# Patient Record
Sex: FEMALE
[De-identification: names, ages, dates, MRNs, and addresses within clinical notes are randomized per-mention and may not be internally consistent; named-entity substitution may affect disease eponyms.]

---

## 2019-02-14 ENCOUNTER — RESULT REVIEW (OUTPATIENT)
Age: 29
End: 2019-02-14

## 2021-04-05 ENCOUNTER — APPOINTMENT (OUTPATIENT)
Dept: DISASTER EMERGENCY | Facility: OTHER | Age: 31
End: 2021-04-05
Payer: COMMERCIAL

## 2021-04-05 PROCEDURE — 0001A: CPT

## 2021-04-26 ENCOUNTER — APPOINTMENT (OUTPATIENT)
Dept: DISASTER EMERGENCY | Facility: OTHER | Age: 31
End: 2021-04-26
Payer: COMMERCIAL

## 2021-04-26 PROCEDURE — 0002A: CPT

## 2024-03-15 ENCOUNTER — EMERGENCY (EMERGENCY)
Facility: HOSPITAL | Age: 34
LOS: 1 days | Discharge: HOME | End: 2024-03-15
Admitting: EMERGENCY MEDICINE
Payer: COMMERCIAL

## 2024-03-15 VITALS
TEMPERATURE: 97 F | DIASTOLIC BLOOD PRESSURE: 97 MMHG | OXYGEN SATURATION: 93 % | RESPIRATION RATE: 22 BRPM | HEART RATE: 130 BPM | SYSTOLIC BLOOD PRESSURE: 142 MMHG

## 2024-03-15 VITALS
TEMPERATURE: 98 F | DIASTOLIC BLOOD PRESSURE: 71 MMHG | SYSTOLIC BLOOD PRESSURE: 124 MMHG | RESPIRATION RATE: 18 BRPM | OXYGEN SATURATION: 95 % | HEART RATE: 102 BPM

## 2024-03-15 LAB
ALBUMIN SERPL ELPH-MCNC: 3.7 G/DL — SIGNIFICANT CHANGE UP (ref 3.3–5)
ALP SERPL-CCNC: 99 U/L — SIGNIFICANT CHANGE UP (ref 40–120)
ALT FLD-CCNC: 43 U/L — HIGH (ref 4–33)
ANION GAP SERPL CALC-SCNC: 15 MMOL/L — HIGH (ref 7–14)
APAP SERPL-MCNC: <10 UG/ML — LOW (ref 15–25)
AST SERPL-CCNC: 126 U/L — HIGH (ref 4–32)
BASOPHILS # BLD AUTO: 0.03 K/UL — SIGNIFICANT CHANGE UP (ref 0–0.2)
BASOPHILS NFR BLD AUTO: 0.6 % — SIGNIFICANT CHANGE UP (ref 0–2)
BILIRUB SERPL-MCNC: 0.5 MG/DL — SIGNIFICANT CHANGE UP (ref 0.2–1.2)
BUN SERPL-MCNC: 4 MG/DL — LOW (ref 7–23)
CALCIUM SERPL-MCNC: 8.7 MG/DL — SIGNIFICANT CHANGE UP (ref 8.4–10.5)
CHLORIDE SERPL-SCNC: 103 MMOL/L — SIGNIFICANT CHANGE UP (ref 98–107)
CO2 SERPL-SCNC: 26 MMOL/L — SIGNIFICANT CHANGE UP (ref 22–31)
CREAT SERPL-MCNC: 0.68 MG/DL — SIGNIFICANT CHANGE UP (ref 0.5–1.3)
EGFR: 118 ML/MIN/1.73M2 — SIGNIFICANT CHANGE UP
EOSINOPHIL # BLD AUTO: 0.01 K/UL — SIGNIFICANT CHANGE UP (ref 0–0.5)
EOSINOPHIL NFR BLD AUTO: 0.2 % — SIGNIFICANT CHANGE UP (ref 0–6)
ETHANOL SERPL-MCNC: 322 MG/DL — HIGH
GLUCOSE SERPL-MCNC: 86 MG/DL — SIGNIFICANT CHANGE UP (ref 70–99)
HCG SERPL-ACNC: <1 MIU/ML — SIGNIFICANT CHANGE UP
HCT VFR BLD CALC: 37.1 % — SIGNIFICANT CHANGE UP (ref 34.5–45)
HGB BLD-MCNC: 13.3 G/DL — SIGNIFICANT CHANGE UP (ref 11.5–15.5)
IANC: 2.43 K/UL — SIGNIFICANT CHANGE UP (ref 1.8–7.4)
IMM GRANULOCYTES NFR BLD AUTO: 0.2 % — SIGNIFICANT CHANGE UP (ref 0–0.9)
LYMPHOCYTES # BLD AUTO: 2.41 K/UL — SIGNIFICANT CHANGE UP (ref 1–3.3)
LYMPHOCYTES # BLD AUTO: 46.1 % — HIGH (ref 13–44)
MCHC RBC-ENTMCNC: 35.8 GM/DL — SIGNIFICANT CHANGE UP (ref 32–36)
MCHC RBC-ENTMCNC: 36.1 PG — HIGH (ref 27–34)
MCV RBC AUTO: 100.8 FL — HIGH (ref 80–100)
MONOCYTES # BLD AUTO: 0.34 K/UL — SIGNIFICANT CHANGE UP (ref 0–0.9)
MONOCYTES NFR BLD AUTO: 6.5 % — SIGNIFICANT CHANGE UP (ref 2–14)
NEUTROPHILS # BLD AUTO: 2.43 K/UL — SIGNIFICANT CHANGE UP (ref 1.8–7.4)
NEUTROPHILS NFR BLD AUTO: 46.4 % — SIGNIFICANT CHANGE UP (ref 43–77)
NRBC # BLD: 0 /100 WBCS — SIGNIFICANT CHANGE UP (ref 0–0)
NRBC # FLD: 0.02 K/UL — HIGH (ref 0–0)
PLATELET # BLD AUTO: 391 K/UL — SIGNIFICANT CHANGE UP (ref 150–400)
POTASSIUM SERPL-MCNC: 3.4 MMOL/L — LOW (ref 3.5–5.3)
POTASSIUM SERPL-SCNC: 3.4 MMOL/L — LOW (ref 3.5–5.3)
PROT SERPL-MCNC: 7.2 G/DL — SIGNIFICANT CHANGE UP (ref 6–8.3)
RBC # BLD: 3.68 M/UL — LOW (ref 3.8–5.2)
RBC # FLD: 12.2 % — SIGNIFICANT CHANGE UP (ref 10.3–14.5)
SALICYLATES SERPL-MCNC: <0.3 MG/DL — LOW (ref 15–30)
SARS-COV-2 RNA SPEC QL NAA+PROBE: SIGNIFICANT CHANGE UP
SODIUM SERPL-SCNC: 144 MMOL/L — SIGNIFICANT CHANGE UP (ref 135–145)
TOXICOLOGY SCREEN, DRUGS OF ABUSE, SERUM RESULT: SIGNIFICANT CHANGE UP
TSH SERPL-MCNC: 1.19 UIU/ML — SIGNIFICANT CHANGE UP (ref 0.27–4.2)
WBC # BLD: 5.23 K/UL — SIGNIFICANT CHANGE UP (ref 3.8–10.5)
WBC # FLD AUTO: 5.23 K/UL — SIGNIFICANT CHANGE UP (ref 3.8–10.5)

## 2024-03-15 PROCEDURE — 93010 ELECTROCARDIOGRAM REPORT: CPT

## 2024-03-15 PROCEDURE — 99285 EMERGENCY DEPT VISIT HI MDM: CPT

## 2024-03-15 RX ADMIN — Medication 2 MILLIGRAM(S): at 17:48

## 2024-03-15 NOTE — ED ADULT NURSE NOTE - NSFALLUNIVINTERV_ED_ALL_ED
Bed/Stretcher in lowest position, wheels locked, appropriate side rails in place/Call bell, personal items and telephone in reach/Instruct patient to call for assistance before getting out of bed/chair/stretcher/Non-slip footwear applied when patient is off stretcher/Steen to call system/Physically safe environment - no spills, clutter or unnecessary equipment/Purposeful proactive rounding/Room/bathroom lighting operational, light cord in reach Bed/Stretcher in lowest position, wheels locked, appropriate side rails in place/Instruct patient to call for assistance before getting out of bed/chair/stretcher/Non-slip footwear applied when patient is off stretcher/Physically safe environment - no spills, clutter or unnecessary equipment/Purposeful proactive rounding

## 2024-03-15 NOTE — ED ADULT TRIAGE NOTE - CHIEF COMPLAINT QUOTE
Pt Rapid Responds found out front of hospital, rolling around on grass, st" I am having a panic attack" Pt refusing to cooperate with triage questions, refusing to give name. Pt crying . Pt st" I have anxiety I am having a panic attack....I am not crazy.... I take prozac and took it yesterday...today I am having bad day...just quit my job...my friend is sick up stairs . I am under a lot of stress and I drank a lot today. I don't want to hurt self or anyone else. Im just stressed. I want to leave I don't want to stay here." Pt elopement risk. Pt to BH. NP Savita to triage.

## 2024-03-15 NOTE — ED PROVIDER NOTE - NSFOLLOWUPINSTRUCTIONS_ED_ALL_ED_FT
Follow up with your PMD within 48-72 hrs. Show copies of your reports given to you.   Worsening, continued or new concerning symptoms return to the emergency department.    You have been given information necessary to follow up with the  [facility] ([facility]) Crisis center & other outpatient  psychiatric clinics within your community    • [facility] walk in Crisis centre  [address] [place] [address]  [phone] [email or web address]  Hours of operation:  Day	                                        Hours  Sunday                                  Closed  Monday                                9am - 3pm  Tuesday                                9am - 3pm  Wednesday                          9am - 3pm  Thursday                               9am - 3pm  Friday                                    9am - 3pm  Saturday                                Closed

## 2024-03-15 NOTE — ED ADULT TRIAGE NOTE - NSTRIAGECARE_GEN_A_ER
_     Chief Complaint   Patient presents with    Follow-up    Shortness of Breath     at rest and with activity     Cough     not resolved since PE    Bleeding/Bruising     bruises easily      DIAGNOSIS:        Severe unintentional weight loss  Un triggered pulmonary embolism  Abdominal pain  Recent GI bleeding  Gastritis  Persistent diarrhea  Elevated tumor markers. CURRENT THERAPY:         Xarelto for PE  GI work-up    BRIEF CASE HISTORY:      Ms. Noé Clark is a very pleasant 48 y.o. female with history of multiple co morbidities as listed. Patient is referred for evaluation of weight loss and possible underlying malignancy. The patient was recently hospitalized because of increasing shortness of breath and respiratory failure. She was found to have pulmonary embolism. She was treated with anticoagulation with Xarelto. Shortly before discharge she had upper GI bleeding. She had endoscopies and she was found to have severe gastritis. After stabilization patient was back on anticoagulation. She has been stable since then. Patient had problems with weight loss. She lost about 100 pounds over the last year. She has generalized weakness and fatigue. She has decreased appetite. She had chronic nausea. Early satiety. She denies any fever or night sweats. She had problem with night sweats in the past.  No enlarged lymph nodes. No unusual headaches or dizziness. No other complaints. Patient quit smoking at the time of hospitalization. Used to smoke 1 pack/day since teenage. Social alcohol. .     INTERIM HISTORY:    seen for follow up after ct scans and labs. She had GI work-up as well. She had endoscopies. Continues to have weight loss and weakness. She has persistent diarrhea. No GI bleeding. She is on Xarelto for pulmonary embolism tolerated well. No chest pain or hemoptysis.   She has symptoms related to COPD. PAST MEDICAL HISTORY: has a past medical history of Anxiety, CAD (coronary artery disease), Fatty liver, GERD (gastroesophageal reflux disease), Headache, History of bronchitis, Hyperlipidemia, Hypothyroidism, Kidney stone, LFT elevation, MVP (mitral valve prolapse), Obesity, Osteoarthritis of cervical spine, Pulmonary emboli (HCC), Type 2 diabetes mellitus without complication ([facility] [address] and Umbilical hernia. PAST SURGICAL HISTORY: has a past surgical history that includes Upper gastrointestinal endoscopy (2010); hernia repair; Cholecystectomy; Appendectomy;  section; Colonoscopy (); Colonoscopy (14); Colonoscopy (2014); pr exploratory of abdomen (10/21/2014); Colonoscopy (N/A, 2018); Hysterectomy, total abdominal; Upper gastrointestinal endoscopy (N/A, 3/10/2021); and Colonoscopy (N/A, 2021). CURRENT MEDICATIONS:  has a current medication list which includes the following prescription(s): tizanidine, oxycodone-acetaminophen, ropinirole, ondansetron, esomeprazole, sertraline, levothyroxine, trazodone, albuterol sulfate hfa, rivaroxaban, clonazepam, topiramate, metoprolol tartrate, and albuterol sulfate hfa. ALLERGIES:  is allergic to compazine [prochlorperazine], sulfa antibiotics, and adhesive tape. FAMILY HISTORY: Grandmother had breast cancer. Great-grandmother had breast cancer. I believe one of her parents had cancer as well. Possibly pancreatic. Otherwise negative for any hematological or oncological conditions. SOCIAL HISTORY:  reports that she quit smoking about 13 months ago. Her smoking use included cigarettes. She has a 8.25 pack-year smoking history. She has never used smokeless tobacco. She reports that she does not drink alcohol and does not use drugs. REVIEW OF SYSTEMS:     · General: Positive for weakness and fatigue. Positive for weight loss and decreased appetite. No fever or chills.    · Eyes: No blurred vision, eye pain or double vision. · Ears: No hearing problems or drainage. No tinnitus. · Throat: No sore throat, problems with swallowing or dysphagia. · Respiratory: No cough, sputum or hemoptysis. Positive for exertional shortness of breath. No pleuritic chest pain. · Cardiovascular: No chest pain, orthopnea or PND. No lower extremity edema. No palpitation. · Gastrointestinal: No problems with swallowing. No abdominal pain or bloating. No nausea or vomiting. No diarrhea or constipation. No GI bleeding. · Genitourinary: No dysuria, hematuria, frequency or urgency. · Musculoskeletal: No muscle aches or pains. No limitation of movement. No back pain. No gait disturbance, No joint complaints. · Dermatologic: No skin rashes or pruritus. No skin lesions or discolorations. · Psychiatric: No depression, anxiety, or stress or signs of schizophrenia. No change in mood or affect. · Hematologic: No history of bleeding tendency. No bruises or ecchymosis. No history of clotting problems. · Infectious disease: No fever, chills or frequent infections. · Endocrine: No polydipsia or polyuria. No temperature intolerance. · Neurologic: No headaches or dizziness. No weakness or numbness of the extremities. No changes in balance, coordination,  memory, mentation, behavior. · Allergic/Immunologic: No nasal congestion or hives. No repeated infections. PHYSICAL EXAM:  The patient is not in acute distress. Vital signs: Blood pressure 113/79, pulse 73, temperature 98.3 °F (36.8 °C), temperature source Oral, resp. rate 16, weight 186 lb 4.8 oz (84.5 kg), last menstrual period 11/01/2010, not currently breastfeeding.      General appearance - well appearing, not in pain or distress  Mental status - good mood, alert and oriented  Eyes - pupils equal and reactive, extraocular eye movements intact  Ears - bilateral TM's and external ear canals normal  Nose - normal and patent, no erythema, discharge or polyps  Mouth - mucous membranes moist, pharynx normal without lesions  Neck - supple, no significant adenopathy  Lymphatics - no palpable lymphadenopathy, no hepatosplenomegaly  Chest - clear to auscultation, no wheezes, rales or rhonchi, symmetric air entry  Heart - normal rate, regular rhythm, normal S1, S2, no murmurs, rubs, clicks or gallops  Abdomen - soft, generalized abdominal tenderness, nondistended, no masses or organomegaly  Neurological - alert, oriented, normal speech, no focal findings or movement disorder noted  Musculoskeletal - no joint tenderness, deformity or swelling  Extremities - peripheral pulses normal, no pedal edema, no clubbing or cyanosis  Skin - normal coloration and turgor, no rashes, no suspicious skin lesions noted     Review of Diagnostic data:   Lab Results   Component Value Date    WBC 5.3 02/01/2022    HGB 14.0 02/01/2022    HCT 40.8 02/01/2022    MCV 92.5 02/01/2022     02/01/2022       Chemistry        Component Value Date/Time     02/01/2022 0955    K 4.0 02/01/2022 0955     02/01/2022 0955    CO2 21 02/01/2022 0955    BUN 21 (H) 02/01/2022 0955    CREATININE 0.94 (H) 02/01/2022 0955        Component Value Date/Time    CALCIUM 9.4 02/01/2022 0955    ALKPHOS 114 (H) 02/01/2022 0955    AST 13 02/01/2022 0955    ALT 8 02/01/2022 0955    BILITOT 0.17 (L) 02/01/2022 0955            IMPRESSION:   Severe unintentional weight loss  Un triggered pulmonary embolism  Abdominal pain  Recent GI bleeding  Gastritis  Persistent diarrhea  Elevated tumor markers. COPD      PLAN:I again explained to the patient the nature of these problems with unintentional weight loss and unexplained pulmonary embolism. There were no major risk factors for pulmonary embolism. This is concerning for underlying cause. Obviously differential diagnosis for unprovoked pulmonary embolism will include malignancy. That becomes more concerning with her unintentional weight loss.     Work up for EKG hypercoagulability is negative. Further workup for occult malignancy is negative except for elevated CEA and . She had previous history of hysterectomy and BSO. Follow up tumor marker  is normal.   She has persistent diarrhea. She continues to have follow-up with gastroenterology. Will refer to pulmonary for further management of COPD. We will see her in 6 months with labs. Sooner for any problems. Patient's questions were answered to the best of her satisfaction and she verbalized full understanding and agreement.

## 2024-03-15 NOTE — ED ADULT NURSE NOTE - OBJECTIVE STATEMENT
Break Covering RN: A&OX4, awake, and alert, ambulatory, h/o anxiety and depression compliant with meds. Patient is coming to ED in regards to a panic attack. Patient states she has increased stressors at this time due to her quitting her job today, does not states why she quit her job. Patient endorses drinking ETOH today, states about "1 bottle". denies any other recent drug use at this time, meds given as ordered, will continue to monitor.

## 2024-03-15 NOTE — ED PROVIDER NOTE - CLINICAL SUMMARY MEDICAL DECISION MAKING FREE TEXT BOX
34 yo F PMH MDD, Anxiety p/w increased anxiety and panic attack s/p visiting her friend upstairs in the hospital as well as reporting she received bad news of quitting her job today. 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 Patient admits to drinking a whole bottle of E&J today. Admits to having a "bad day." No recent fall, no LOC, no prior hospitalization for alcohol intox, alcohol withdrawal or no seizure history. Denies fever, headache, dizziness, SI/HI/AH/VH, chills, chest pain, shortness of breath, abdominal pain, sick contact or recent travel. Denies any other drugs.   Ativan  SW collateral  Dispo as per collateral 34 yo F PMH MDD, Anxiety p/w increased anxiety and panic attack s/p visiting her friend upstairs in the hospital as well as reporting she received bad news of quitting her job today. 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 Patient admits to drinking a whole bottle of E&J today. Admits to having a "bad day." No recent fall, no LOC, no prior hospitalization for alcohol intox, alcohol withdrawal or no seizure history. Denies fever, headache, dizziness, SI/HI/AH/VH, chills, chest pain, shortness of breath, abdominal pain, sick contact or recent travel. Denies any other drugs.   Ativan  Labs, EKG, COVID  SW collateral  Psych consult  Dispo as per collateral 32 yo F PMH MDD, Anxiety p/w increased anxiety and panic attack s/p visiting her friend upstairs in the hospital as well as reporting she received bad news of quitting her job today. 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 Patient admits to drinking a whole bottle of E&J today. Admits to having a "bad day." No recent fall, no LOC, no prior hospitalization for alcohol intox, alcohol withdrawal or no seizure history. Denies fever, headache, dizziness, SI/HI/AH/VH, chills, chest pain, shortness of breath, abdominal pain, sick contact or recent travel. Denies any other drugs.   Ativan  Labs, EKG, COVID  SW collateral  Psych consult  Dispo as per collateral

## 2024-03-15 NOTE — ED BEHAVIORAL HEALTH NOTE - BEHAVIORAL HEALTH NOTE
Writer met with pt’s in triage, pt provided phone number for mother Chelsea ([phone]) and aunt wes ([phone]) .    Writer contacted mother Chelsea ([phone]) to obtain collateral information. writer left a  requesting a return call.    Writer contacted pt’s aunt wes ([phone]) to obtain collateral information. the following information is per the aunt .    Pt is a 32 yo female domiciled w/  mother , employed w/ [employer] remotely, single no children, hx of anxiety and panic attacks, bib self.    Reason for ed visit: aunt unaware what brings pt to the ed.     Symptoms/hx: aunt reports a hx of si and unaware of any past attempts. she says pt did mention earlier this week she tried to hurt herself but unaware how long ago she did or what she did. She says pt also mentioned this week “I don’t want to be here”. She says pt does not endorse any AVH or delusions. She says pt does appear paranoid and accuses people of doing things which are not true. She says pt has been angry and emotional this week. she says pt has been pacing a lot and blaming everyone. She says the family has been concerned the past week after pt returned home from a work trip. She is unsure about pt’s sleep, hygiene and appetite.    Baseline: fun, jokes around, hard working.    Stressors: work?    Treatment team: unknown. Pt has no past psych admissions but has a hx of ed visits.    Medical problems: none reported.    Medication : unknown.    Family hx: none reported.    Violence/aggression: pt is not violent or aggressive.    Drug/alcohol use: she says pt drinks alcohol unsure how often and how much.    Dispo: Aunt aware pt will be cleated for discharge.    Writer met with pt at bedside. Pt accepted SBIRT hotline information and coping skills worksheet for panic attacks. She reports that she is linked to a psychiatrist/therapist and plans to f/u with them. Writer met with pt’s in triage, pt provided phone number for mother Chelsea ([phone]) and aunt wes ([phone]) .    Writer contacted mother Chelsea ([phone]) to obtain collateral information. writer left a  requesting a return call.    Writer contacted pt’s aunt wes ([phone]) to obtain collateral information. the following information is per the aunt .    Pt is a 32 yo female domiciled w/  mother , employed w/ [employer] remotely, single no children, hx of anxiety and panic attacks, bib self.    Reason for ed visit: aunt unaware what brings pt to the ed.     Symptoms/hx: aunt reports a hx of si and unaware of any past attempts. she says pt did mention earlier this week she tried to hurt herself but unaware how long ago she did or what she did. She says pt also mentioned this week “I don’t want to be here”. She says pt does not endorse any AVH or delusions. She says pt does appear paranoid and accuses people of doing things which are not true. She says pt has been angry and emotional this week. she says pt has been pacing a lot and blaming everyone. She says the family has been concerned the past week after pt returned home from a work trip. She is unsure about pt’s sleep, hygiene and appetite.    Baseline: fun, jokes around, hard working.    Stressors: work?    Treatment team: unknown. Pt has no past psych admissions but has a hx of ed visits.    Medical problems: none reported.    Medication : unknown.    Family hx: none reported.    Violence/aggression: pt is not violent or aggressive.    Drug/alcohol use: she says pt drinks alcohol unsure how often and how much.    Dispo: Aunt aware pt will be cleated for discharge.    Writer met with pt at bedside. Pt accepted SBIRT hotline information and coping skills worksheet for panic attacks. She reports that she is linked to a psychiatrist/therapist and plans to f/u with them.    mother Chelsea ([phone]) called and provided additional information. she reports pt is struggling with stress and seems depressed. she reports pt not sleeping well and can be seen pacing at night. she says pt hygiene is okay and appetite is okay.  she says pt will make statements such as " I wish I could die" and last said that earlier this week. no past suicide attempts. she says pt denies any drug or alcohol use. Writer met with pt’s in triage, pt provided phone number for mother Chelsea ([phone]) and aunt wes ([phone]) .    Writer contacted mother Chelsea ([phone]) to obtain collateral information. writer left a  requesting a return call.    Writer contacted pt’s aunt wes ([phone]) to obtain collateral information. the following information is per the aunt .    Pt is a 34 yo female domiciled w/  mother , employed w/ [employer] remotely, single no children, hx of anxiety and panic attacks, bib self.    Reason for ed visit: aunt unaware what brings pt to the ed.     Symptoms/hx: aunt reports a hx of si and unaware of any past attempts. she says pt did mention earlier this week she tried to hurt herself but unaware how long ago she did or what she did. She says pt also mentioned this week “I don’t want to be here”. She says pt does not endorse any AVH or delusions. She says pt does appear paranoid and accuses people of doing things which are not true. She says pt has been angry and emotional this week. she says pt has been pacing a lot and blaming everyone. She says the family has been concerned the past week after pt returned home from a work trip. She is unsure about pt’s sleep, hygiene and appetite.    Baseline: fun, jokes around, hard working.    Stressors: work?    Treatment team: unknown. Pt has no past psych admissions but has a hx of ed visits.    Medical problems: none reported.    Medication : unknown.    Family hx: none reported.    Violence/aggression: pt is not violent or aggressive.    Drug/alcohol use: she says pt drinks alcohol unsure how often and how much.    Dispo: Aunt aware pt will be cleated for discharge.    Writer met with pt at bedside. Pt accepted SBIRT hotline information and coping skills worksheet for panic attacks. She reports that she is linked to a psychiatrist/therapist and plans to f/u with them.    mother Chelsea ([phone]) called and provided additional information. she reports pt is struggling with stress and seems depressed. she reports pt not sleeping well and can be seen pacing at night. she says pt hygiene is okay and appetite is okay.  she says pt will make statements such as " I wish I could die" and last said that earlier this week. no past suicide attempts. she says pt denies any drug or alcohol use.    Writer informed mother pt is cleared for discharge. she is requesting for assistance with taxi. writer scheduled taxi via [employer] #[id number].

## 2024-03-15 NOTE — ED PROVIDER NOTE - PATIENT PORTAL LINK FT
You can access the FollowMyHealth Patient Portal offered by [facility] by registering at the following website: [email or web address]. By joining [employer]’s FollowMyHealth portal, you will also be able to view your health information using other applications (apps) compatible with our system. You can access the FollowMyHealth Patient Portal offered by [facility] by registering at the following website: [email or web address]. By joining [employer]’s FollowMyHealth portal, you will also be able to view your health information using other applications (apps) compatible with our system.

## 2024-03-15 NOTE — ED PROVIDER NOTE - SKIN, MLM
Skin normal color for race, warm, dry and intact. No evidence of rash. IV discontinued, cath removed intact

## 2024-03-15 NOTE — ED ADULT NURSE REASSESSMENT NOTE - NS ED NURSE REASSESS COMMENT FT1
1800 Received report from RN MB pt calm denies si/hi/avh presently, pt cleared & d/c by provider awaiting transport eval on going.

## 2024-03-18 NOTE — ED POST DISCHARGE NOTE - REASON FOR FOLLOW-UP
Patient called asking for discharge summary. Emailed to patient's email on sunrise. Patient also asked if could return bc having another panic attack. I told patient of course if not feeling well or having recurring symptoms she may return to ED. Other